# Patient Record
Sex: FEMALE | Race: WHITE | NOT HISPANIC OR LATINO | Employment: FULL TIME | URBAN - METROPOLITAN AREA
[De-identification: names, ages, dates, MRNs, and addresses within clinical notes are randomized per-mention and may not be internally consistent; named-entity substitution may affect disease eponyms.]

---

## 2023-08-23 PROBLEM — R00.2 HEART PALPITATIONS: Status: ACTIVE | Noted: 2023-08-23

## 2023-08-23 PROBLEM — E10.65 TYPE 1 DIABETES MELLITUS WITH HYPERGLYCEMIA (MULTI): Status: ACTIVE | Noted: 2023-08-23

## 2023-08-23 PROBLEM — E78.2 MIXED HYPERLIPIDEMIA: Status: ACTIVE | Noted: 2023-08-23

## 2023-08-23 PROBLEM — I10 ESSENTIAL (PRIMARY) HYPERTENSION: Status: ACTIVE | Noted: 2023-08-23

## 2023-08-23 RX ORDER — PROPRANOLOL HYDROCHLORIDE 60 MG/1
60 CAPSULE, EXTENDED RELEASE ORAL DAILY
COMMUNITY
Start: 2021-07-01 | End: 2023-12-29 | Stop reason: ALTCHOICE

## 2023-08-23 RX ORDER — SIMVASTATIN 40 MG/1
40 TABLET, FILM COATED ORAL NIGHTLY
COMMUNITY
End: 2023-12-29 | Stop reason: DRUGHIGH

## 2023-08-23 RX ORDER — LISINOPRIL 5 MG/1
5 TABLET ORAL DAILY
COMMUNITY
Start: 2021-07-01

## 2023-08-23 RX ORDER — INSULIN DETEMIR 100 [IU]/ML
INJECTION, SOLUTION SUBCUTANEOUS
COMMUNITY
Start: 2021-07-01 | End: 2023-10-16 | Stop reason: WASHOUT

## 2023-08-23 RX ORDER — METOPROLOL TARTRATE 25 MG/1
0.5 TABLET, FILM COATED ORAL DAILY
COMMUNITY

## 2023-08-23 RX ORDER — INSULIN ASPART 100 [IU]/ML
INJECTION, SOLUTION INTRAVENOUS; SUBCUTANEOUS
COMMUNITY
Start: 2023-04-17 | End: 2024-03-25 | Stop reason: WASHOUT

## 2023-08-23 RX ORDER — INSULIN LISPRO 100 [IU]/ML
INJECTION, SOLUTION INTRAVENOUS; SUBCUTANEOUS
COMMUNITY
Start: 2021-07-01 | End: 2024-03-25 | Stop reason: WASHOUT

## 2023-10-16 ENCOUNTER — CLINICAL SUPPORT (OUTPATIENT)
Dept: ENDOCRINOLOGY | Facility: CLINIC | Age: 53
End: 2023-10-16
Payer: COMMERCIAL

## 2023-10-16 VITALS
SYSTOLIC BLOOD PRESSURE: 132 MMHG | DIASTOLIC BLOOD PRESSURE: 76 MMHG | BODY MASS INDEX: 44.39 KG/M2 | WEIGHT: 250.6 LBS | HEART RATE: 63 BPM

## 2023-10-16 DIAGNOSIS — I10 ESSENTIAL (PRIMARY) HYPERTENSION: ICD-10-CM

## 2023-10-16 DIAGNOSIS — E10.65 TYPE 1 DIABETES MELLITUS WITH HYPERGLYCEMIA (MULTI): Primary | ICD-10-CM

## 2023-10-16 DIAGNOSIS — E78.2 MIXED HYPERLIPIDEMIA: ICD-10-CM

## 2023-10-16 LAB — POC HEMOGLOBIN A1C: 8.1 % (ref 4.2–6.5)

## 2023-10-16 PROCEDURE — 83036 HEMOGLOBIN GLYCOSYLATED A1C: CPT | Performed by: PHARMACIST

## 2023-10-16 PROCEDURE — 99214 OFFICE O/P EST MOD 30 MIN: CPT | Performed by: PHARMACIST

## 2023-10-16 PROCEDURE — 95251 CONT GLUC MNTR ANALYSIS I&R: CPT | Performed by: PHARMACIST

## 2023-10-16 NOTE — PROGRESS NOTES
52 yo with Diabetes 1 (dx age 29 + Antibody testing), HTN, Dyslipidemia presents for followup. A1c-8.1% today,  7.6% last visit.      Pump: tandem tslim with dexcom cgm using control iq           pump training with torito April 17th           Basal:           12a 1.75           ICR:           12a 1:4  -changed from 1:5 last visit           ISF:           12a 15  -changed from 20 last visit            active insulin time: 5hrs           targets: 110  - issues with narciso, phone no longer compatible. Pump manually downloaded today for review  - reviewed various modes/targets, no sleep schedule set currently. appears to be benefiting from access to control iq autoboluses around the clock at the moment, will revisit           avg daily insulin summary:           basal 51%           food bolus 5%           correction bolus 8%           control iq autobolus 36%           avg total daily dose 95u/d  CGM: dexcom g6 using apps  office sg 197 slight decr arrow  Hypoglycemia Sx/Tx/Prevention: reviewed symptoms, treatment and prevention advised carrying glucose source at all times.       /76   Pulse 63   Wt 114 kg (250 lb 9.6 oz)   BMI 44.39 kg/m²        Current Outpatient Medications on File Prior to Visit   Medication Sig Dispense Refill    insulin aspart (NovoLOG U-100 Insulin aspart) 100 unit/mL injection as directed up to 130 units daily in pump Injection , disp 12 vials for 90 days      insulin aspart (NovoLOG) 100 unit/mL injection USE AS DIRECTED UP  UNITS DAILY IN PUMP 120 mL 1    insulin lispro (HumaLOG U-100 Insulin) 100 unit/mL injection        lisinopril 5 mg tablet Take 1 tablet (5 mg) by mouth once daily.      metoprolol tartrate (Lopressor) 25 mg tablet Take 0.5 tablets (12.5 mg) by mouth once daily.      propranolol LA (Inderal LA) 60 mg 24 hr capsule Take 1 capsule (60 mg) by mouth once daily.      simvastatin (Zocor) 40 mg tablet Take 1 tablet (40 mg) by mouth once daily at bedtime. for 90 days       [DISCONTINUED] insulin detemir (Levemir U-100 Insulin) 100 unit/mL injection         No current facility-administered medications on file prior to visit.      1. Type 1 diabetes mellitus with hyperglycemia (CMS/[facility])  Work on bolusing / entering grams carb at meals  Intensify isf from 15 to now 10   - POCT glycosylated hemoglobin (Hb A1C) manually resulted    2. Essential (primary) hypertension  At target on therapy    3. Mixed hyperlipidemia  On statin,  tolerating     Treatment and plan discussed with Dr. Dobbs. RAMAKRISHNA Cisse, PharmD, BC-[facility], [facility].   I Dr. Dobbs have reviewed this progress note, medication list, vital signs, any pertinent lab values, and any CGM data if present with the certified diabetes . This note reflects the treatment plan that was made with my input on the data that was presented above. I saw the patient face to face while reviewing the above data and formulating the treatment plan with the certified diabetes . Dr. Sina Dobbs.

## 2023-10-16 NOTE — PATIENT INSTRUCTIONS
Changes made to pump settings:   - correction factor from 15 to now 10     Focus on bolusing before all meals,  entering even half grams carb intending to eat will help pump out better than nothing at all

## 2023-10-19 ENCOUNTER — PHARMACY VISIT (OUTPATIENT)
Dept: PHARMACY | Facility: CLINIC | Age: 53
End: 2023-10-19
Payer: MEDICAID

## 2023-10-19 PROCEDURE — RXMED WILLOW AMBULATORY MEDICATION CHARGE

## 2023-12-19 ENCOUNTER — APPOINTMENT (OUTPATIENT)
Dept: ENDOCRINOLOGY | Facility: CLINIC | Age: 53
End: 2023-12-19
Payer: COMMERCIAL

## 2023-12-28 NOTE — PROGRESS NOTES
HPI    54 yo with Diabetes 1 (dx age 29 + Antibody testing), HTN, Dyslipidemia presents for followup. A1c-6.7% today.  Pt testing 7X/day with dexcom, watching carbs but not always bolusing for them.    Tslim with dexcom g6:  Basal: 1.75   I:C 1:4  Isf-10    -pt needs to change infusion set q 2 days, skin breaks down at site after 2 days    -pt is not putting in full amount of carbs she eats and often skips this  -only 4% of her dosage is prandial boluses by the pt    30 day dexcom data: 60% in range, 0% low, pattern: upper 100's/low 200's overnight, waking mid/upper 100's, after lunch low 200's, upper 100's at dinner into bedtime.           Taking simvastatin 40mg daily for lipids.           Taking lisinopril 10mg and toprol 25mg 1/2 bid.           [facility] labs: June 2023 ldl-82, dec 2023 cbc-wnl, lft-wnl, cr-0.63      Current Outpatient Medications:     insulin aspart (NovoLOG U-100 Insulin aspart) 100 unit/mL injection, as directed up to 130 units daily in pump Injection , disp 12 vials for 90 days, Disp: , Rfl:     insulin aspart (NovoLOG) 100 unit/mL injection, USE AS DIRECTED UP  UNITS DAILY IN PUMP, Disp: 120 mL, Rfl: 1    lisinopril 5 mg tablet, Take 1 tablet (5 mg) by mouth once daily., Disp: , Rfl:     metoprolol tartrate (Lopressor) 25 mg tablet, Take 0.5 tablets (12.5 mg) by mouth once daily., Disp: , Rfl:     simvastatin (Zocor) 40 mg tablet, Take 1 tablet (40 mg) by mouth once daily at bedtime. for 90 days, Disp: , Rfl:     insulin lispro (HumaLOG U-100 Insulin) 100 unit/mL injection,  , Disp: , Rfl:     propranolol LA (Inderal LA) 60 mg 24 hr capsule, Take 1 capsule (60 mg) by mouth once daily., Disp: , Rfl:       Allergies as of 12/29/2023 - Reviewed 12/29/2023   Allergen Reaction Noted    Paroxetine hcl Unknown 08/23/2023    Prednisone Unknown 08/23/2023    Sulfamethoxazole-trimethoprim Unknown 08/23/2023         Review of Systems   Cardiology: Lightheadedness-denies.  Chest pain-denies.  Leg  "edema-denies.  Palpitations-denies.  Respiratory: Cough-denies. Shortness of breath-denies.  Wheezing-denies.  Gastroenterology: Constipation-denies.  Diarrhea-denies.  Heartburn-denies.  Endocrinology: Cold intolerance-denies.  Heat intolerance-denies.  Sweats-denies.  Neurology: Headache-denies.  Tremor-denies.  Neuropathy in extremities-denies.  Psychology: Low energy-denies.  Irritability-denies.  Sleep disturbances-denies.      /68 (BP Location: Left arm, Patient Position: Sitting)   Pulse 75   Wt 113 kg (250 lb)   BMI 44.29 kg/m²       Labs:  No results found for: \"WBC\", \"NRBC\", \"RBC\", \"HGB\", \"HCT\", \"PLT\"  Lab Results   Component Value Date    CALCIUM 9.2 06/20/2021     06/20/2021    K 3.7 06/20/2021     06/20/2021    CO2 27 06/20/2021    ANIONGAP 13 06/20/2021    BUN 9 06/20/2021    CREATININE 0.69 06/20/2021    GLUCOSE 139 (H) 06/20/2021     No results found for: \"CHOL\", \"TRIG\", \"HDL\", \"LDLCALC\"  No results found for: \"MICROALBCREA\"  No results found for: \"TSH\"  No results found for: \"WPPLBKTN30\"  Lab Results   Component Value Date    HGBA1C 6.7 (A) 12/29/2023         Physical Exam   General Appearance: pleasant, cooperative, no acute distress  HEENT: no chemosis, no proptosis, no lid lag, no lid retraction  Neck: no lymphadenopathy, no thyromegaly, no dominant thyroid nodules  Heart: no murmurs, regular rate and rhythm, S1 and S2  Lungs: no wheezes, no rhonci, no rales  Extremities: no lower extremity swelling      Assessment/Plan   1. Type 1 diabetes mellitus with hyperglycemia (CMS/HCC)  -ordered and reviewed A1c  -reviewed 30 days dexcom data, scanned in epic  -reviewed [facility] labs    -issue is pt not bolusing full carbs for meals, explained the need to bolus for carbs regardless of her blood sugar    -change infusion sets q 2 days due to skin breakdown    2. Essential (primary) hypertension  -at target on therapy after resting on meds    3. Mixed hyperlipidemia  -on statin, would " change to atorvastatin 40mg for ldl<70         Follow Up:  HAILEY Moran 3 months    Medical Decision Making  Complexity of problem: Chronic illness of diabetes mellitus uncontrolled, progressing  Data analyzed and reviewed: Reviewed prior notes, blood glucose data, labs including HgbA1c, lipids, serum chemistries.  Ordered tests.   Risk of complications and morbidities: Is definite because of use of insulin and risk of hypoglycemia.  Prescription medications reviewed and modifications made.  Compliance assessed.  Addressed social determinants of health including food insecurity.

## 2023-12-29 ENCOUNTER — OFFICE VISIT (OUTPATIENT)
Dept: ENDOCRINOLOGY | Facility: CLINIC | Age: 53
End: 2023-12-29
Payer: COMMERCIAL

## 2023-12-29 VITALS
HEART RATE: 75 BPM | BODY MASS INDEX: 44.29 KG/M2 | DIASTOLIC BLOOD PRESSURE: 68 MMHG | SYSTOLIC BLOOD PRESSURE: 134 MMHG | WEIGHT: 250 LBS

## 2023-12-29 DIAGNOSIS — E78.2 MIXED HYPERLIPIDEMIA: Primary | ICD-10-CM

## 2023-12-29 DIAGNOSIS — E10.65 TYPE 1 DIABETES MELLITUS WITH HYPERGLYCEMIA (MULTI): ICD-10-CM

## 2023-12-29 DIAGNOSIS — I10 ESSENTIAL (PRIMARY) HYPERTENSION: ICD-10-CM

## 2023-12-29 LAB — POC HEMOGLOBIN A1C: 6.7 % (ref 4.2–6.5)

## 2023-12-29 PROCEDURE — 4010F ACE/ARB THERAPY RXD/TAKEN: CPT | Performed by: INTERNAL MEDICINE

## 2023-12-29 PROCEDURE — 3075F SYST BP GE 130 - 139MM HG: CPT | Performed by: INTERNAL MEDICINE

## 2023-12-29 PROCEDURE — 3078F DIAST BP <80 MM HG: CPT | Performed by: INTERNAL MEDICINE

## 2023-12-29 PROCEDURE — 95251 CONT GLUC MNTR ANALYSIS I&R: CPT | Performed by: INTERNAL MEDICINE

## 2023-12-29 PROCEDURE — 99214 OFFICE O/P EST MOD 30 MIN: CPT | Performed by: INTERNAL MEDICINE

## 2023-12-29 PROCEDURE — 83036 HEMOGLOBIN GLYCOSYLATED A1C: CPT | Performed by: INTERNAL MEDICINE

## 2023-12-29 RX ORDER — ATORVASTATIN CALCIUM 40 MG/1
40 TABLET, FILM COATED ORAL DAILY
Qty: 90 TABLET | Refills: 3 | Status: SHIPPED | OUTPATIENT
Start: 2023-12-29 | End: 2024-12-28

## 2023-12-29 ASSESSMENT — PAIN SCALES - GENERAL: PAINLEVEL: 8

## 2023-12-29 ASSESSMENT — PATIENT HEALTH QUESTIONNAIRE - PHQ9
2. FEELING DOWN, DEPRESSED OR HOPELESS: NOT AT ALL
1. LITTLE INTEREST OR PLEASURE IN DOING THINGS: NOT AT ALL
SUM OF ALL RESPONSES TO PHQ9 QUESTIONS 1 & 2: 0

## 2024-01-22 DIAGNOSIS — E10.65 TYPE 1 DIABETES MELLITUS WITH HYPERGLYCEMIA (MULTI): Primary | ICD-10-CM

## 2024-01-22 RX ORDER — INSULIN ASPART 100 [IU]/ML
INJECTION, SOLUTION INTRAVENOUS; SUBCUTANEOUS
Qty: 120 ML | Refills: 1 | Status: SHIPPED | OUTPATIENT
Start: 2024-01-22 | End: 2025-01-21

## 2024-02-08 PROCEDURE — RXMED WILLOW AMBULATORY MEDICATION CHARGE

## 2024-02-09 ENCOUNTER — PHARMACY VISIT (OUTPATIENT)
Dept: PHARMACY | Facility: CLINIC | Age: 54
End: 2024-02-09
Payer: MEDICAID

## 2024-03-25 NOTE — PROGRESS NOTES
"HPI   54 yo with Diabetes 1 (dx age 29 + Antibody testing), HTN, Dyslipidemia presents for followup. A1c-% today,  8.1% last visit.      Pump: tandem t-slim with dexcom G6 using control IQ (pump training 4/17/2023), novolog  - phone no longer compatible with tandem narciso - downloaded pump today in office for review/issues with home download    Basal:           12a 1.75  ICR:           12a 1:4   ISF:           12a 10  -changed from 15 last visit     Average Total Daily Dose  108.14 units/day  Avg Daily Basal 40%/ 43.02 units  Food Bolus 7% (was 4% last visit)  Control IQ 45%   Calculated Total Daily Basal  42.0 units    Sleep mode: not using; benefiting from auto boluses overnight  Infusion sets: jimena-steel, changing every 2 days due to site breakdown  -has back up basal insulin at home  -pump failure protocol reviewed  -reinforced treating with 5-10 grams of fasting acting carbs for hypoglycemia       Current Outpatient Medications:     atorvastatin (Lipitor) 40 mg tablet, Take 1 tablet (40 mg) by mouth once daily., Disp: 90 tablet, Rfl: 3    insulin aspart (NovoLOG U-100 Insulin aspart) 100 unit/mL injection, USE AS DIRECTED UP  UNITS DAILY IN PUMP, Disp: 120 mL, Rfl: 1    lisinopril 5 mg tablet, Take 1 tablet (5 mg) by mouth once daily., Disp: , Rfl:     metoprolol tartrate (Lopressor) 25 mg tablet, Take 0.5 tablets (12.5 mg) by mouth once daily., Disp: , Rfl:     Allergies as of 03/29/2024 - Reviewed 03/29/2024   Allergen Reaction Noted    Paroxetine hcl Unknown 08/23/2023    Prednisone Unknown 08/23/2023    Sulfamethoxazole-trimethoprim Unknown 08/23/2023       /82 (BP Location: Right arm, Patient Position: Sitting)   Pulse 70   Wt 114 kg (251 lb 12.8 oz)   BMI 44.60 kg/m²     Labs:   No results found for: \"WBC\", \"NRBC\", \"RBC\", \"HGB\", \"HCT\", \"PLT\"  Lab Results   Component Value Date    CALCIUM 9.2 06/20/2021     06/20/2021    K 3.7 06/20/2021     06/20/2021    CO2 27 06/20/2021    " "ANIONGAP 13 06/20/2021    BUN 9 06/20/2021    CREATININE 0.69 06/20/2021    GLUCOSE 139 (H) 06/20/2021     No results found for: \"CHOL\", \"TRIG\", \"HDL\", \"LDLCALC\"  No results found for: \"MICROALBCREA\"  No results found for: \"TSH\"  No results found for: \"WULULPMQ30\"  Lab Results   Component Value Date    HGBA1C 7.6 (A) 03/29/2024         Assessment/Plan   1. Type 1 diabetes mellitus with hyperglycemia (CMS/[facility])  -A1c ordered and reviewed    -t-connect data reviewed with patient (scanned in epic), reviewed glycemic targets, reinforced looking for patterns and trends; reviewed using new Source platform for review of data and uploads before visits    -continues to not enter carbs before meals - she has been working to be better at this; assess for intensification of ICR next visit    -multiple auto boluses when not eating (basal 40%) - would benefit from increasing basal to 0.85 units    -has been having low carb protein shake for lunch past week with minimal impact on blood sugar, reinforced continued use as meal replacement  -having carb snacks between meals and hs, reinforced low carb/protein snacks      2. Essential (primary) hypertension  -at target    3. Mixed hyperlipidemia  -on statin and tolerating        Follow up: Dr. Dobbs 4 months 30 minutes    -labs/tests/notes reviewed  -reviewed and counseled patient on medication monitoring and side effects    Treatment and plan discussed with Dr. Dobbs. Karen RN Certified Diabetes Care and     Medical Decision Making  Complexity of problem: Chronic illness of diabetes mellitus uncontrolled, progressing  Data analyzed and reviewed: Reviewed prior notes, blood glucose data, labs including HgbA1c, lipids, serum chemistries.  Ordered tests.   Risk of complications and morbidities: Is definite because of use of insulin and risk of hypoglycemia.  Prescription medications reviewed and modifications made.  Compliance assessed.  Addressed social " determinants of health including food insecurity.

## 2024-03-29 ENCOUNTER — CLINICAL SUPPORT (OUTPATIENT)
Dept: ENDOCRINOLOGY | Facility: CLINIC | Age: 54
End: 2024-03-29
Payer: COMMERCIAL

## 2024-03-29 VITALS
BODY MASS INDEX: 44.6 KG/M2 | SYSTOLIC BLOOD PRESSURE: 126 MMHG | WEIGHT: 251.8 LBS | DIASTOLIC BLOOD PRESSURE: 82 MMHG | HEART RATE: 70 BPM

## 2024-03-29 DIAGNOSIS — E78.2 MIXED HYPERLIPIDEMIA: ICD-10-CM

## 2024-03-29 DIAGNOSIS — E10.65 TYPE 1 DIABETES MELLITUS WITH HYPERGLYCEMIA (MULTI): Primary | ICD-10-CM

## 2024-03-29 DIAGNOSIS — I10 ESSENTIAL (PRIMARY) HYPERTENSION: ICD-10-CM

## 2024-03-29 LAB — POC HEMOGLOBIN A1C: 7.6 % (ref 4.2–6.5)

## 2024-03-29 PROCEDURE — 95251 CONT GLUC MNTR ANALYSIS I&R: CPT | Performed by: INTERNAL MEDICINE

## 2024-03-29 PROCEDURE — 99214 OFFICE O/P EST MOD 30 MIN: CPT | Performed by: INTERNAL MEDICINE

## 2024-03-29 PROCEDURE — 83036 HEMOGLOBIN GLYCOSYLATED A1C: CPT | Performed by: INTERNAL MEDICINE

## 2024-03-29 ASSESSMENT — PAIN SCALES - GENERAL: PAINLEVEL: 0-NO PAIN

## 2024-03-29 NOTE — PROGRESS NOTES
I, Dr Sina Dobbs, have reviewed this progress note, medication list, vital signs, any pertinent lab values, and any CGM data if present with the Certified Diabetes Care and  face to face during this visit today. This note reflects the treatment plan that was made under my direction after reviewing the above mentioned elements while face to face with the patient and CDE.  I personally answered and addressed any questions and concerns the patient had during the visit today.  The CDE entered the data in this note under my direction and I personally reviewed it, signed any lab or medication orders that I instructed to be completed. I am the billing provider for this visit and the level of service was determined by my involvement in the Medical Decision Making Component of this visit while face to face with the patient.    Electronically signed by Sina Dobbs MD on 3/29/24 at 3:12 PM.

## 2024-03-29 NOTE — PATIENT INSTRUCTIONS
See pump settings for changes made today    Focus on entering carbs before your meals  If bolusing mid meal, decrease amount of carbs entered    Have protein, low carbs snacks    Use new website Source for uploads - upload 1 week before visit

## 2024-04-10 DIAGNOSIS — E10.65 TYPE 1 DIABETES MELLITUS WITH HYPERGLYCEMIA (MULTI): Primary | ICD-10-CM

## 2024-04-11 ENCOUNTER — TELEPHONE (OUTPATIENT)
Dept: ENDOCRINOLOGY | Facility: CLINIC | Age: 54
End: 2024-04-11
Payer: COMMERCIAL

## 2024-04-11 DIAGNOSIS — E10.65 TYPE 1 DIABETES MELLITUS WITH HYPERGLYCEMIA (MULTI): Primary | ICD-10-CM

## 2024-04-11 RX ORDER — BLOOD-GLUCOSE SENSOR
EACH MISCELLANEOUS
Qty: 3 EACH | Refills: 5 | Status: SHIPPED | OUTPATIENT
Start: 2024-04-11

## 2024-04-11 NOTE — TELEPHONE ENCOUNTER
Patient is currently using Dexcom G6 and needs a refill on her sensors to be sent to [facility].     Rx pending to be sent.     Zehra

## 2024-04-11 NOTE — TELEPHONE ENCOUNTER
Addended by: Caitlin Burnett on: 9/13/2022 11:47 AM     Modules accepted: Orders Do we have any samples we can give patient for Dexcom G6?    She is out and waiting for a PA through insurance.     Zehra

## 2024-05-04 PROCEDURE — RXMED WILLOW AMBULATORY MEDICATION CHARGE

## 2024-05-09 ENCOUNTER — PHARMACY VISIT (OUTPATIENT)
Dept: PHARMACY | Facility: CLINIC | Age: 54
End: 2024-05-09
Payer: MEDICARE

## 2024-05-29 ENCOUNTER — PHARMACY VISIT (OUTPATIENT)
Dept: PHARMACY | Facility: CLINIC | Age: 54
End: 2024-05-29
Payer: MEDICARE

## 2024-05-29 PROCEDURE — RXMED WILLOW AMBULATORY MEDICATION CHARGE

## 2024-06-03 ENCOUNTER — PATIENT MESSAGE (OUTPATIENT)
Dept: ENDOCRINOLOGY | Facility: CLINIC | Age: 54
End: 2024-06-03
Payer: COMMERCIAL

## 2024-06-03 DIAGNOSIS — E10.65 TYPE 1 DIABETES MELLITUS WITH HYPERGLYCEMIA (MULTI): Primary | ICD-10-CM

## 2024-06-04 RX ORDER — BLOOD-GLUCOSE TRANSMITTER
EACH MISCELLANEOUS
Qty: 1 EACH | Refills: 0 | Status: SHIPPED | OUTPATIENT
Start: 2024-06-04

## 2024-07-08 DIAGNOSIS — E10.65 TYPE 1 DIABETES MELLITUS WITH HYPERGLYCEMIA (MULTI): ICD-10-CM

## 2024-07-08 PROCEDURE — RXMED WILLOW AMBULATORY MEDICATION CHARGE

## 2024-07-08 RX ORDER — BLOOD-GLUCOSE SENSOR
EACH MISCELLANEOUS
Qty: 9 EACH | Refills: 1 | Status: SHIPPED | OUTPATIENT
Start: 2024-07-08 | End: 2024-07-12 | Stop reason: SDUPTHER

## 2024-07-10 ENCOUNTER — PHARMACY VISIT (OUTPATIENT)
Dept: PHARMACY | Facility: CLINIC | Age: 54
End: 2024-07-10
Payer: MEDICARE

## 2024-07-10 DIAGNOSIS — E10.65 TYPE 1 DIABETES MELLITUS WITH HYPERGLYCEMIA (MULTI): ICD-10-CM

## 2024-07-10 RX ORDER — BLOOD-GLUCOSE TRANSMITTER
EACH MISCELLANEOUS
Qty: 1 EACH | Refills: 0 | Status: SHIPPED | OUTPATIENT
Start: 2024-07-10

## 2024-07-10 NOTE — TELEPHONE ENCOUNTER
Patients insurance requires a PA for her Dexcom G6 Ssensor.  Please send to  [facility] (pended Rx) for PA process.      Thank you   Zehra

## 2024-07-12 ENCOUNTER — TELEPHONE (OUTPATIENT)
Dept: ENDOCRINOLOGY | Facility: CLINIC | Age: 54
End: 2024-07-12
Payer: COMMERCIAL

## 2024-07-12 DIAGNOSIS — E10.65 TYPE 1 DIABETES MELLITUS WITH HYPERGLYCEMIA (MULTI): ICD-10-CM

## 2024-07-12 RX ORDER — BLOOD-GLUCOSE SENSOR
EACH MISCELLANEOUS
Qty: 9 EACH | Refills: 1 | Status: SHIPPED | OUTPATIENT
Start: 2024-07-12

## 2024-07-12 NOTE — TELEPHONE ENCOUNTER
Patient requesting Dexcom G6 Sensor samples.  Her insurance is trying to charge her over $300 for her prescription.     Please assist and advise.   Zehra

## 2024-07-13 NOTE — TELEPHONE ENCOUNTER
Unfortunately office does not have G6 samples at this time.  But as briefly mentioned earlier,  something just isn't sounding right here considering cash price if pt didn't have any insurance is $300+ range.     Per chart review tele encounter 7/10,  G6 sensors required PA & were to be sent to [facility].  Appears instead erx was actually sent for G6 transmitter...  can't say for sure yet but I'm thinking still just needs that PA before insurance will cover/pay anything.    I sent a new erx for G6 sensors to [facility],  hopefully if this is case issue will be resolved once Violet Herrmann gets PA approved.  Will follow back up on status Monday

## 2024-07-25 NOTE — PROGRESS NOTES
HPI   54 yo with Diabetes 1 (dx age 29 + Antibody testing), HTN, Dyslipidemia presents for followup. A1c-7.6% today.     Pump: tandem t-slim with dexcom G6 using control IQ (pump training 4/17/2023), novolog    Basal:           12a 1.85 (was 1.75)  ICR:           12a 1:4   ISF:           12a 10     30 days dexcom data: 47% in target, 0% low, pattern: upper 100's overnight, waking upper 100's/low 200's post breakfast, upper 100's through day, low 200's after evening snack.    -missing boluses/not entering carbs given recent stresses    -concerned about wt and had questions, discussed glp-1RA, pt will consider    -taking simvastatin 40 mg and tolerating    -lisinopril 5mg    [facility] labs summer 2024: ldl-70, cr-0.65, lft-wnl    Current Outpatient Medications:     atorvastatin (Lipitor) 40 mg tablet, Take 1 tablet (40 mg) by mouth once daily., Disp: 90 tablet, Rfl: 3    blood-glucose transmitter device (Dexcom G6 Transmitter) device, Use as instructed every 90 days, Disp: 1 each, Rfl: 0    Dexcom G6 Sensor device, As directed change sensor every 10 days, Disp: 9 each, Rfl: 1    insulin aspart (NovoLOG U-100 Insulin aspart) 100 unit/mL injection, USE AS DIRECTED UP  UNITS DAILY IN PUMP, Disp: 120 mL, Rfl: 1    lisinopril 5 mg tablet, Take 1 tablet (5 mg) by mouth once daily., Disp: , Rfl:     metoprolol tartrate (Lopressor) 25 mg tablet, Take 0.5 tablets (12.5 mg) by mouth once daily., Disp: , Rfl:       Allergies as of 07/26/2024 - Reviewed 07/26/2024   Allergen Reaction Noted    Paroxetine hcl Unknown 08/23/2023    Prednisone Unknown 08/23/2023    Sulfamethoxazole-trimethoprim Unknown 08/23/2023         Review of Systems   Cardiology: Lightheadedness-denies.  Chest pain-denies.  Leg edema-denies.  Palpitations-denies.  Respiratory: Cough-denies. Shortness of breath-denies.  Wheezing-denies.  Gastroenterology: Constipation-denies.  Diarrhea-denies.  Heartburn-denies.  Endocrinology: Cold intolerance-denies.  Heat  "intolerance-denies.  Sweats-denies.  Neurology: Headache-denies.  Tremor-denies.  Neuropathy in extremities-denies.  Psychology: Low energy-denies.  Irritability-denies.  Sleep disturbances-denies.      /78 (BP Location: Left arm, Patient Position: Sitting, BP Cuff Size: Large adult)   Pulse 67   Wt 116 kg (255 lb 12.8 oz)   LMP  (LMP Unknown)   BMI 45.31 kg/m²       Labs:  No results found for: \"WBC\", \"NRBC\", \"RBC\", \"HGB\", \"HCT\", \"PLT\"  Lab Results   Component Value Date    CALCIUM 9.2 06/20/2021     06/20/2021    K 3.7 06/20/2021     06/20/2021    CO2 27 06/20/2021    ANIONGAP 13 06/20/2021    BUN 9 06/20/2021    CREATININE 0.69 06/20/2021    GLUCOSE 139 (H) 06/20/2021       Lab Results   Component Value Date    HGBA1C 7.6 (A) 07/26/2024         Physical Exam   General Appearance: pleasant, cooperative, no acute distress  HEENT: no chemosis, no proptosis, no lid lag, no lid retraction  Neck: no lymphadenopathy, no thyromegaly, no dominant thyroid nodules  Heart: no murmurs, regular rate and rhythm, S1 and S2  Lungs: no wheezes, no rhonci, no rales  Extremities: no lower extremity swelling      Assessment/Plan   1. Type 1 diabetes mellitus with hyperglycemia (Multi)  -A1c ordered and reviewed  -labs reviewed  -pump data reviewed    -biggest issue is bolusing with meals  -work on putting some amount of carb in when you start eating  -if you do this the settings look reasonable    -pt concerned about wt, discussed glp-1RA's    2. Essential (primary) hypertension  -at target on therapy, no change will follow    3. Mixed hyperlipidemia  -on statin and tolerating, recent [facility] labs reviewed         Follow Up:  priscila Aviles 4 months    Medical Decision Making  Complexity of problem: Chronic illness of diabetes mellitus uncontrolled, progressing  Data analyzed and reviewed: Reviewed prior notes, blood glucose data, labs including HgbA1c, lipids, serum chemistries.  Ordered tests.   Risk of " complications and morbidities: Is definite because of use of insulin and risk of hypoglycemia.  Prescription medications reviewed and modifications made.  Compliance assessed.  Addressed social determinants of health including food insecurity.

## 2024-07-26 ENCOUNTER — APPOINTMENT (OUTPATIENT)
Dept: ENDOCRINOLOGY | Facility: CLINIC | Age: 54
End: 2024-07-26
Payer: COMMERCIAL

## 2024-07-26 VITALS
DIASTOLIC BLOOD PRESSURE: 78 MMHG | WEIGHT: 255.8 LBS | HEART RATE: 67 BPM | SYSTOLIC BLOOD PRESSURE: 130 MMHG | BODY MASS INDEX: 45.31 KG/M2

## 2024-07-26 DIAGNOSIS — E10.65 TYPE 1 DIABETES MELLITUS WITH HYPERGLYCEMIA (MULTI): Primary | ICD-10-CM

## 2024-07-26 DIAGNOSIS — E78.2 MIXED HYPERLIPIDEMIA: ICD-10-CM

## 2024-07-26 DIAGNOSIS — I10 ESSENTIAL (PRIMARY) HYPERTENSION: ICD-10-CM

## 2024-07-26 LAB — POC HEMOGLOBIN A1C: 7.6 % (ref 4.2–6.5)

## 2024-07-26 PROCEDURE — 4010F ACE/ARB THERAPY RXD/TAKEN: CPT | Performed by: INTERNAL MEDICINE

## 2024-07-26 PROCEDURE — 83036 HEMOGLOBIN GLYCOSYLATED A1C: CPT | Performed by: INTERNAL MEDICINE

## 2024-07-26 PROCEDURE — 99214 OFFICE O/P EST MOD 30 MIN: CPT | Performed by: INTERNAL MEDICINE

## 2024-07-26 PROCEDURE — 3078F DIAST BP <80 MM HG: CPT | Performed by: INTERNAL MEDICINE

## 2024-07-26 PROCEDURE — 3075F SYST BP GE 130 - 139MM HG: CPT | Performed by: INTERNAL MEDICINE

## 2024-07-26 ASSESSMENT — ENCOUNTER SYMPTOMS: DEPRESSION: 0

## 2024-07-26 ASSESSMENT — PAIN SCALES - GENERAL: PAINLEVEL: 0-NO PAIN

## 2024-08-26 ENCOUNTER — PHARMACY VISIT (OUTPATIENT)
Dept: PHARMACY | Facility: CLINIC | Age: 54
End: 2024-08-26
Payer: MEDICARE

## 2024-08-26 PROCEDURE — RXMED WILLOW AMBULATORY MEDICATION CHARGE

## 2024-08-26 PROCEDURE — RXOTC WILLOW AMBULATORY OTC CHARGE

## 2024-09-11 DIAGNOSIS — E10.65 TYPE 1 DIABETES MELLITUS WITH HYPERGLYCEMIA (MULTI): ICD-10-CM

## 2024-09-11 RX ORDER — INSULIN ASPART 100 [IU]/ML
INJECTION, SOLUTION INTRAVENOUS; SUBCUTANEOUS
Qty: 120 ML | Refills: 1 | Status: SHIPPED | OUTPATIENT
Start: 2024-09-11 | End: 2025-09-11

## 2024-10-01 ENCOUNTER — PHARMACY VISIT (OUTPATIENT)
Dept: PHARMACY | Facility: CLINIC | Age: 54
End: 2024-10-01
Payer: MEDICARE

## 2024-10-01 PROCEDURE — RXMED WILLOW AMBULATORY MEDICATION CHARGE

## 2024-10-02 ENCOUNTER — PHARMACY VISIT (OUTPATIENT)
Dept: PHARMACY | Facility: CLINIC | Age: 54
End: 2024-10-02
Payer: MEDICARE

## 2024-10-28 DIAGNOSIS — E10.65 TYPE 1 DIABETES MELLITUS WITH HYPERGLYCEMIA (MULTI): ICD-10-CM

## 2024-10-28 PROCEDURE — RXMED WILLOW AMBULATORY MEDICATION CHARGE

## 2024-10-28 RX ORDER — BLOOD-GLUCOSE TRANSMITTER
EACH MISCELLANEOUS
Qty: 1 EACH | Refills: 0 | Status: SHIPPED | OUTPATIENT
Start: 2024-10-28

## 2024-10-29 ENCOUNTER — PHARMACY VISIT (OUTPATIENT)
Dept: PHARMACY | Facility: CLINIC | Age: 54
End: 2024-10-29
Payer: MEDICARE

## 2024-10-30 ENCOUNTER — PHARMACY VISIT (OUTPATIENT)
Dept: PHARMACY | Facility: CLINIC | Age: 54
End: 2024-10-30
Payer: MEDICARE

## 2024-11-14 ENCOUNTER — TELEPHONE (OUTPATIENT)
Dept: ENDOCRINOLOGY | Facility: CLINIC | Age: 54
End: 2024-11-14
Payer: COMMERCIAL

## 2024-11-14 PROCEDURE — RXMED WILLOW AMBULATORY MEDICATION CHARGE

## 2024-11-14 NOTE — TELEPHONE ENCOUNTER
Called to reschedule her November appt with Traci due to a conflicting doctors appt elsewhere. Now scheduled for 02/26/2025

## 2024-11-19 ENCOUNTER — PHARMACY VISIT (OUTPATIENT)
Dept: PHARMACY | Facility: CLINIC | Age: 54
End: 2024-11-19
Payer: MEDICARE

## 2024-11-21 ENCOUNTER — APPOINTMENT (OUTPATIENT)
Dept: ENDOCRINOLOGY | Facility: CLINIC | Age: 54
End: 2024-11-21
Payer: COMMERCIAL

## 2024-11-29 ENCOUNTER — PHARMACY VISIT (OUTPATIENT)
Dept: PHARMACY | Facility: CLINIC | Age: 54
End: 2024-11-29
Payer: MEDICARE

## 2024-11-29 PROCEDURE — RXMED WILLOW AMBULATORY MEDICATION CHARGE

## 2024-12-24 PROCEDURE — RXMED WILLOW AMBULATORY MEDICATION CHARGE

## 2024-12-26 PROCEDURE — RXMED WILLOW AMBULATORY MEDICATION CHARGE

## 2024-12-27 ENCOUNTER — PHARMACY VISIT (OUTPATIENT)
Dept: PHARMACY | Facility: CLINIC | Age: 54
End: 2024-12-27
Payer: MEDICARE

## 2024-12-30 ENCOUNTER — PHARMACY VISIT (OUTPATIENT)
Dept: PHARMACY | Facility: CLINIC | Age: 54
End: 2024-12-30
Payer: MEDICARE

## 2025-01-20 DIAGNOSIS — E10.65 TYPE 1 DIABETES MELLITUS WITH HYPERGLYCEMIA (MULTI): ICD-10-CM

## 2025-01-21 PROCEDURE — RXMED WILLOW AMBULATORY MEDICATION CHARGE

## 2025-01-21 RX ORDER — BLOOD-GLUCOSE TRANSMITTER
EACH MISCELLANEOUS
Qty: 1 EACH | Refills: 0 | Status: SHIPPED | OUTPATIENT
Start: 2025-01-21

## 2025-01-22 ENCOUNTER — PHARMACY VISIT (OUTPATIENT)
Dept: PHARMACY | Facility: CLINIC | Age: 55
End: 2025-01-22
Payer: MEDICARE

## 2025-01-23 ENCOUNTER — PHARMACY VISIT (OUTPATIENT)
Dept: PHARMACY | Facility: CLINIC | Age: 55
End: 2025-01-23

## 2025-02-26 ENCOUNTER — APPOINTMENT (OUTPATIENT)
Dept: ENDOCRINOLOGY | Facility: CLINIC | Age: 55
End: 2025-02-26
Payer: COMMERCIAL

## 2025-02-26 DIAGNOSIS — E10.65 TYPE 1 DIABETES MELLITUS WITH HYPERGLYCEMIA (MULTI): ICD-10-CM

## 2025-02-27 PROCEDURE — RXMED WILLOW AMBULATORY MEDICATION CHARGE

## 2025-02-27 RX ORDER — BLOOD-GLUCOSE SENSOR
EACH MISCELLANEOUS
Qty: 9 EACH | Refills: 1 | Status: SHIPPED | OUTPATIENT
Start: 2025-02-27

## 2025-03-01 ENCOUNTER — PHARMACY VISIT (OUTPATIENT)
Dept: PHARMACY | Facility: CLINIC | Age: 55
End: 2025-03-01
Payer: MEDICARE

## 2025-03-03 ENCOUNTER — PHARMACY VISIT (OUTPATIENT)
Dept: PHARMACY | Facility: CLINIC | Age: 55
End: 2025-03-03

## 2025-03-04 ENCOUNTER — APPOINTMENT (OUTPATIENT)
Dept: ENDOCRINOLOGY | Facility: CLINIC | Age: 55
End: 2025-03-04
Payer: COMMERCIAL

## 2025-03-04 VITALS
DIASTOLIC BLOOD PRESSURE: 74 MMHG | BODY MASS INDEX: 43.77 KG/M2 | SYSTOLIC BLOOD PRESSURE: 155 MMHG | WEIGHT: 247 LBS | HEIGHT: 63 IN | HEART RATE: 67 BPM

## 2025-03-04 DIAGNOSIS — E78.2 MIXED HYPERLIPIDEMIA: ICD-10-CM

## 2025-03-04 DIAGNOSIS — E10.65 TYPE 1 DIABETES MELLITUS WITH HYPERGLYCEMIA (MULTI): Primary | ICD-10-CM

## 2025-03-04 DIAGNOSIS — I10 ESSENTIAL (PRIMARY) HYPERTENSION: ICD-10-CM

## 2025-03-04 LAB — POC HEMOGLOBIN A1C: 7.6 % (ref 4.2–6.5)

## 2025-03-04 PROCEDURE — 3008F BODY MASS INDEX DOCD: CPT | Performed by: INTERNAL MEDICINE

## 2025-03-04 PROCEDURE — 83036 HEMOGLOBIN GLYCOSYLATED A1C: CPT | Performed by: INTERNAL MEDICINE

## 2025-03-04 PROCEDURE — 4010F ACE/ARB THERAPY RXD/TAKEN: CPT | Performed by: INTERNAL MEDICINE

## 2025-03-04 PROCEDURE — 99214 OFFICE O/P EST MOD 30 MIN: CPT | Performed by: INTERNAL MEDICINE

## 2025-03-04 PROCEDURE — 3078F DIAST BP <80 MM HG: CPT | Performed by: INTERNAL MEDICINE

## 2025-03-04 PROCEDURE — 3077F SYST BP >= 140 MM HG: CPT | Performed by: INTERNAL MEDICINE

## 2025-03-04 RX ORDER — SIMVASTATIN 40 MG/1
40 TABLET, FILM COATED ORAL NIGHTLY
COMMUNITY

## 2025-03-04 RX ORDER — PANTOPRAZOLE SODIUM 20 MG/1
20 TABLET, DELAYED RELEASE ORAL
COMMUNITY

## 2025-03-04 ASSESSMENT — PATIENT HEALTH QUESTIONNAIRE - PHQ9
1. LITTLE INTEREST OR PLEASURE IN DOING THINGS: NOT AT ALL
SUM OF ALL RESPONSES TO PHQ9 QUESTIONS 1 & 2: 0
2. FEELING DOWN, DEPRESSED OR HOPELESS: NOT AT ALL

## 2025-03-04 ASSESSMENT — PAIN SCALES - GENERAL: PAINLEVEL_OUTOF10: 0-NO PAIN

## 2025-03-04 NOTE — PROGRESS NOTES
HPI   54 yo with Diabetes 1 (dx age 29 + Antibody testing), HTN, Dyslipidemia presents for followup. A1c-7.6% today again.     Pump: tandem t-slim with dexcom G6 using control IQ (pump training 4/17/2023), novolog     Basal:           12a 1.85   ICR:           12a 1:4   ISF:           12a 10      30 days dexcom data: 50% in target, 0% low, pattern: upper 100's overnight, waking upper 100's/low 200's post breakfast, upper 100's through day, low 200's after evening snack.     -worried about lows/better with bolusing     -concerned about wt and had questions, discussed glp-1RA, pt will consider per previous discussions     -taking simvastatin 40 mg and tolerating     -lisinopril 5mg     [facility] labs summer 2024: ldl-70, cr-0.65, lft-wnl  Jan 2025: A1c-7.7%, ldl-60, cbc-wnl, urine micro-neg, cr-0.7, lft-wnl      Current Outpatient Medications:     blood-glucose transmitter device (Dexcom G6 Transmitter) device, Use as instructed every 90 days, Disp: 1 each, Rfl: 0    Dexcom G6 Sensor device, As directed change sensor every 10 days, Disp: 9 each, Rfl: 1    insulin aspart (NovoLOG U-100 Insulin aspart) 100 unit/mL injection, USE AS DIRECTED UP  UNITS DAILY IN PUMP, Disp: 120 mL, Rfl: 1    lisinopril 5 mg tablet, Take 1 tablet (5 mg) by mouth once daily., Disp: , Rfl:     metoprolol tartrate (Lopressor) 25 mg tablet, Take 0.5 tablets (12.5 mg) by mouth once daily., Disp: , Rfl:     pantoprazole (Protonix) 20 mg EC tablet, Take 1 tablet (20 mg) by mouth once daily in the morning. Take before meals. Do not crush, chew, or split., Disp: , Rfl:     simvastatin (Zocor) 40 mg tablet, Take 1 tablet (40 mg) by mouth once daily at bedtime., Disp: , Rfl:     atorvastatin (Lipitor) 40 mg tablet, Take 1 tablet (40 mg) by mouth once daily., Disp: 90 tablet, Rfl: 3      Allergies as of 03/04/2025 - Reviewed 07/26/2024   Allergen Reaction Noted    Paroxetine hcl Unknown 08/23/2023    Prednisone Unknown 08/23/2023     "Sulfamethoxazole-trimethoprim Unknown 08/23/2023         Review of Systems   Cardiology: Lightheadedness-denies.  Chest pain-denies.  Leg edema-denies.  Palpitations-denies.  Respiratory: Cough-denies. Shortness of breath-denies.  Wheezing-denies.  Gastroenterology: Constipation-denies.  Diarrhea-denies.  Heartburn-denies.  Endocrinology: Cold intolerance-denies.  Heat intolerance-denies.  Sweats-denies.  Neurology: Headache-denies.  Tremor-denies.  Neuropathy in extremities-denies.  Psychology: Low energy-denies.  Irritability-denies.  Sleep disturbances-denies.      /74 (BP Location: Right arm, Patient Position: Sitting)   Pulse 67   Ht 1.6 m (5' 2.99\")   Wt 112 kg (247 lb)   LMP  (LMP Unknown)   BMI 43.77 kg/m²       Labs:  No results found for: \"WBC\", \"NRBC\", \"RBC\", \"HGB\", \"HCT\", \"PLT\"  Lab Results   Component Value Date    CALCIUM 9.2 06/20/2021     06/20/2021    K 3.7 06/20/2021     06/20/2021    CO2 27 06/20/2021    ANIONGAP 13 06/20/2021    BUN 9 06/20/2021    CREATININE 0.69 06/20/2021    GLUCOSE 139 (H) 06/20/2021       Lab Results   Component Value Date    HGBA1C 7.6 (A) 03/04/2025         Physical Exam   General Appearance: pleasant, cooperative, no acute distress  HEENT: no chemosis, no proptosis, no lid lag, no lid retraction  Neck: no lymphadenopathy, no thyromegaly, no dominant thyroid nodules  Heart: no murmurs, regular rate and rhythm, S1 and S2  Lungs: no wheezes, no rhonci, no rales  Extremities: no lower extremity swelling      Assessment/Plan   1. Type 1 diabetes mellitus with hyperglycemia (Multi) (Primary)  -A1c ordered and reviewed  -cgm data reviewed  -labs reviewed    -pt still afraid of lows if putting in full carb amount  -change isf from 10 to 15  -change I:C from 1:4 to 1:5  -when in doubt enter at least 30 grams carb per meal, if still going low call office to change I:C ratio    -consider 50% dose reduction when active    2. Essential (primary) " hypertension  -at target on meds after resting, will follow    3. Mixed hyperlipidemia  -on statin, [facility] labs reviewed, no change at target will monitor         Follow Up:  jean Moran 3 months    Medical Decision Making  Complexity of problem: Chronic illness of diabetes mellitus uncontrolled, progressing  Data analyzed and reviewed: Reviewed prior notes, blood glucose data, labs including HgbA1c, lipids, serum chemistries.  Ordered tests.   Risk of complications and morbidities: Is definite because of use of insulin and risk of hypoglycemia.  Prescription medications reviewed and modifications made.  Compliance assessed.  Addressed social determinants of health including food insecurity.

## 2025-03-24 PROCEDURE — RXMED WILLOW AMBULATORY MEDICATION CHARGE

## 2025-03-25 ENCOUNTER — PHARMACY VISIT (OUTPATIENT)
Dept: PHARMACY | Facility: CLINIC | Age: 55
End: 2025-03-25
Payer: MEDICARE

## 2025-03-25 PROCEDURE — RXMED WILLOW AMBULATORY MEDICATION CHARGE

## 2025-03-26 ENCOUNTER — PHARMACY VISIT (OUTPATIENT)
Dept: PHARMACY | Facility: CLINIC | Age: 55
End: 2025-03-26
Payer: MEDICARE

## 2025-04-13 PROCEDURE — RXMED WILLOW AMBULATORY MEDICATION CHARGE

## 2025-04-15 ENCOUNTER — PHARMACY VISIT (OUTPATIENT)
Dept: PHARMACY | Facility: CLINIC | Age: 55
End: 2025-04-15
Payer: MEDICAID

## 2025-05-01 PROCEDURE — RXMED WILLOW AMBULATORY MEDICATION CHARGE

## 2025-05-05 ENCOUNTER — PHARMACY VISIT (OUTPATIENT)
Dept: PHARMACY | Facility: CLINIC | Age: 55
End: 2025-05-05
Payer: MEDICARE

## 2025-05-05 DIAGNOSIS — E10.65 TYPE 1 DIABETES MELLITUS WITH HYPERGLYCEMIA (MULTI): ICD-10-CM

## 2025-05-05 PROCEDURE — RXMED WILLOW AMBULATORY MEDICATION CHARGE

## 2025-05-05 RX ORDER — INSULIN ASPART 100 [IU]/ML
INJECTION, SOLUTION INTRAVENOUS; SUBCUTANEOUS
Qty: 120 ML | Refills: 1 | Status: SHIPPED | OUTPATIENT
Start: 2025-05-05 | End: 2026-05-05

## 2025-05-06 ENCOUNTER — PHARMACY VISIT (OUTPATIENT)
Dept: PHARMACY | Facility: CLINIC | Age: 55
End: 2025-05-06
Payer: MEDICARE

## 2025-05-30 PROCEDURE — RXMED WILLOW AMBULATORY MEDICATION CHARGE

## 2025-06-02 ENCOUNTER — PHARMACY VISIT (OUTPATIENT)
Dept: PHARMACY | Facility: CLINIC | Age: 55
End: 2025-06-02
Payer: MEDICARE

## 2025-06-06 NOTE — PROGRESS NOTES
HPI   52 yo with Diabetes 1 (dx age 29 + Antibody testing), HTN, Dyslipidemia presents for followup. E3z-cvp 7.6%, today 7.5%.     Tandem t-slim with control IQ, Dexcom G6, pump training 4/17/2023    Time  Basal Rate (u/hour)  12:00 AM  1.85             Total Daily Basal:            44.4 units           Time  Correction Factor (mg/dl)  12:00 AM  15 -decreased from 10 last visit     Time  Carb Ratio (unit:grams)  12:00 AM   1:5   -decreased from 1:4 last visit      30 days dexcom data: 47% in target, <1% low, pattern: upper 100's overnight and waking, upper 100's/low 200's post breakfast, upper 100's through day until bedtime     -worried about lows/a little better with bolusing     -concerned about wt and had questions, discussed glp-1RA last visit, pt will consider per previous discussions     -taking simvastatin 40 mg for lipids and tolerating     -taking lisinopril 5mg     [facility] labs summer 2024: ldl-70, cr-0.65, lft-wnl  Jan 2025: A1c-7.7%, ldl-60, cbc-wnl, urine micro-neg, cr-0.7, lft-wnl        Current Outpatient Medications:     blood-glucose transmitter device (Dexcom G6 Transmitter) device, Use as instructed every 90 days, Disp: 1 each, Rfl: 0    Dexcom G6 Sensor device, As directed change sensor every 10 days, Disp: 9 each, Rfl: 1    ergocalciferol (Vitamin D-2) 200 mcg/mL (8,000 units/mL) drops, Take by mouth., Disp: , Rfl:     furosemide (Lasix) 20 mg tablet, Take 1 tablet (20 mg) by mouth., Disp: , Rfl:     insulin aspart (NovoLOG U-100 Insulin aspart) 100 unit/mL injection, USE AS DIRECTED UP  UNITS DAILY IN PUMP, Disp: 120 mL, Rfl: 1    lisinopril 5 mg tablet, Take 1 tablet (5 mg) by mouth once daily., Disp: , Rfl:     metoprolol tartrate (Lopressor) 25 mg tablet, Take 0.5 tablets (12.5 mg) by mouth once daily., Disp: , Rfl:     pantoprazole (Protonix) 20 mg EC tablet, Take 1 tablet (20 mg) by mouth once daily in the morning. Take before meals. Do not crush, chew, or split., Disp: , Rfl:      "simvastatin (Zocor) 40 mg tablet, Take 1 tablet (40 mg) by mouth once daily at bedtime., Disp: , Rfl:     atorvastatin (Lipitor) 40 mg tablet, Take 1 tablet (40 mg) by mouth once daily., Disp: 90 tablet, Rfl: 3    Allergies as of 06/09/2025 - Reviewed 06/09/2025   Allergen Reaction Noted    Paroxetine hcl Unknown 08/23/2023    Prednisone Unknown 08/23/2023    Semaglutide GI Upset 09/08/2022    Sulfamethoxazole-trimethoprim Unknown 08/23/2023       /67 (BP Location: Left arm, Patient Position: Sitting)   Pulse 66   Ht 1.6 m (5' 2.99\")   Wt 111 kg (244 lb)   LMP  (LMP Unknown)   BMI 43.23 kg/m²     Labs:     Lab Results   Component Value Date    CALCIUM 9.2 06/20/2021     06/20/2021    K 3.7 06/20/2021     06/20/2021    CO2 27 06/20/2021    ANIONGAP 13 06/20/2021    BUN 9 06/20/2021    CREATININE 0.69 06/20/2021    GLUCOSE 139 (H) 06/20/2021       Lab Results   Component Value Date    HGBA1C 7.5 (A) 06/09/2025         Assessment/Plan   1. Type 1 diabetes mellitus with hyperglycemia (Multi) (Primary)  -A1c ordered and reviewed  -[facility] labs reviewed from 5/2025  -tandem source data reviewed with patient (scanned in epic)    -remains fearful of lows and rarely entering bolus for meals; work on entering some carbs before all meals, can enter 20 grams if unsure of carbs   -had 12 hour sleep schedule overnight with another scheduled for 5 hours on wed/thurs/sun (active [employer] days), thought sleep mode would protect against lows  -reviewed sleep mode and exercise mode in detail   -will benefit from exercise mode 24/7  -changed sleep mode to 11PM-8AM for future use if needed    -info provided to upgrade pump software to use Dexcom G7  -reviewed symptoms, treatment and prevention of hypoglycemia, reinforced carrying glucose source at all times.    2. Essential (primary) hypertension  -at target    3. Mixed hyperlipidemia  -on statin and tolerating    4. Presence of hybrid closed-loop insulin pump " system  -tandem t-slim with control IQ        Follow up: Dr. Dobbs 6 months      -labs/tests/notes reviewed  -reviewed and counseled patient on medication monitoring and side effects    Treatment and plan discussed with Dr. Dobbs. Karen SALDANA Certified Diabetes Care and     Medical Decision Making  Complexity of problem: Chronic illness of diabetes mellitus uncontrolled, progressing  Data analyzed and reviewed: Reviewed prior notes, blood glucose data, labs including HgbA1c, lipids, serum chemistries.  Ordered tests.   Risk of complications and morbidities: Is definite because of use of insulin and risk of hypoglycemia.  Prescription medications reviewed and modifications made.  Compliance assessed.  Addressed social determinants of health including food insecurity.

## 2025-06-09 ENCOUNTER — APPOINTMENT (OUTPATIENT)
Dept: ENDOCRINOLOGY | Facility: CLINIC | Age: 55
End: 2025-06-09
Payer: COMMERCIAL

## 2025-06-09 VITALS
WEIGHT: 244 LBS | HEIGHT: 63 IN | SYSTOLIC BLOOD PRESSURE: 133 MMHG | HEART RATE: 66 BPM | DIASTOLIC BLOOD PRESSURE: 67 MMHG | BODY MASS INDEX: 43.23 KG/M2

## 2025-06-09 DIAGNOSIS — Z96.41 PRESENCE OF HYBRID CLOSED-LOOP INSULIN PUMP SYSTEM: ICD-10-CM

## 2025-06-09 DIAGNOSIS — I10 ESSENTIAL (PRIMARY) HYPERTENSION: ICD-10-CM

## 2025-06-09 DIAGNOSIS — E78.2 MIXED HYPERLIPIDEMIA: ICD-10-CM

## 2025-06-09 DIAGNOSIS — E10.65 TYPE 1 DIABETES MELLITUS WITH HYPERGLYCEMIA (MULTI): Primary | ICD-10-CM

## 2025-06-09 LAB — POC HEMOGLOBIN A1C: 7.5 % (ref 4.2–6.5)

## 2025-06-09 PROCEDURE — 83036 HEMOGLOBIN GLYCOSYLATED A1C: CPT | Mod: QW | Performed by: INTERNAL MEDICINE

## 2025-06-09 PROCEDURE — 83036 HEMOGLOBIN GLYCOSYLATED A1C: CPT | Performed by: REGISTERED NURSE

## 2025-06-09 PROCEDURE — 99214 OFFICE O/P EST MOD 30 MIN: CPT | Performed by: INTERNAL MEDICINE

## 2025-06-09 PROCEDURE — 95251 CONT GLUC MNTR ANALYSIS I&R: CPT | Performed by: INTERNAL MEDICINE

## 2025-06-09 RX ORDER — ERGOCALCIFEROL (VITAMIN D2) 200 MCG/ML
DROPS ORAL
COMMUNITY

## 2025-06-09 RX ORDER — BLOOD-GLUCOSE SENSOR
EACH MISCELLANEOUS
Qty: 9 EACH | Refills: 1 | Status: SHIPPED | OUTPATIENT
Start: 2025-06-09

## 2025-06-09 RX ORDER — FUROSEMIDE 20 MG/1
20 TABLET ORAL
COMMUNITY
Start: 2024-05-10

## 2025-06-09 ASSESSMENT — PAIN SCALES - GENERAL: PAINLEVEL_OUTOF10: 6

## 2025-06-09 NOTE — PROGRESS NOTES
Attestation signed by Sina Dobbs MD on 6/9/25 at 4:21 PM.    I, Dr Sina Dobbs, have reviewed this progress note, medication list, vital signs, any pertinent lab values, and any CGM data if present with the Certified Diabetes Care and  face to face during this visit today. This note reflects the treatment plan that was made under my direction after reviewing the above mentioned elements while face to face with the patient and CDE.  I personally answered and addressed any questions and concerns the patient had during the visit today.  The CDE entered the data in this note under my direction and I personally reviewed it, signed any lab or medication orders that I instructed to be completed. I am the billing provider for this visit and the level of service was determined by my involvement in the Medical Decision Making Component of this visit while face to face with the patient.

## 2025-06-09 NOTE — PATIENT INSTRUCTIONS
Enter carbs before all meals  20 grams carbs if unknown     exercise mode 24/7    Update pump to new software to use Dexcom G7

## 2025-06-25 DIAGNOSIS — E10.65 TYPE 1 DIABETES MELLITUS WITH HYPERGLYCEMIA (MULTI): ICD-10-CM

## 2025-06-25 PROCEDURE — RXMED WILLOW AMBULATORY MEDICATION CHARGE

## 2025-06-25 RX ORDER — BLOOD-GLUCOSE TRANSMITTER
EACH MISCELLANEOUS
Qty: 1 EACH | Refills: 0 | Status: SHIPPED | OUTPATIENT
Start: 2025-06-25

## 2025-06-26 ENCOUNTER — PHARMACY VISIT (OUTPATIENT)
Dept: PHARMACY | Facility: CLINIC | Age: 55
End: 2025-06-26
Payer: MEDICARE

## 2025-07-21 PROCEDURE — RXMED WILLOW AMBULATORY MEDICATION CHARGE

## 2025-07-22 ENCOUNTER — PHARMACY VISIT (OUTPATIENT)
Dept: PHARMACY | Facility: CLINIC | Age: 55
End: 2025-07-22
Payer: MEDICARE

## 2025-07-29 PROCEDURE — RXMED WILLOW AMBULATORY MEDICATION CHARGE

## 2025-07-31 ENCOUNTER — PHARMACY VISIT (OUTPATIENT)
Dept: PHARMACY | Facility: CLINIC | Age: 55
End: 2025-07-31
Payer: MEDICARE

## 2025-08-25 DIAGNOSIS — E10.65 TYPE 1 DIABETES MELLITUS WITH HYPERGLYCEMIA (MULTI): ICD-10-CM

## 2025-08-26 PROCEDURE — RXMED WILLOW AMBULATORY MEDICATION CHARGE

## 2025-08-26 RX ORDER — BLOOD-GLUCOSE SENSOR
EACH MISCELLANEOUS
Qty: 9 EACH | Refills: 1 | Status: SHIPPED | OUTPATIENT
Start: 2025-08-26

## 2025-08-27 ENCOUNTER — PHARMACY VISIT (OUTPATIENT)
Dept: PHARMACY | Facility: CLINIC | Age: 55
End: 2025-08-27
Payer: MEDICARE

## 2025-12-09 ENCOUNTER — APPOINTMENT (OUTPATIENT)
Facility: CLINIC | Age: 55
End: 2025-12-09
Payer: COMMERCIAL